# Patient Record
Sex: MALE | Race: ASIAN | NOT HISPANIC OR LATINO | URBAN - METROPOLITAN AREA
[De-identification: names, ages, dates, MRNs, and addresses within clinical notes are randomized per-mention and may not be internally consistent; named-entity substitution may affect disease eponyms.]

---

## 2017-07-04 ENCOUNTER — EMERGENCY (EMERGENCY)
Facility: HOSPITAL | Age: 32
LOS: 1 days | Discharge: HOME | End: 2017-07-04
Attending: EMERGENCY MEDICINE | Admitting: EMERGENCY MEDICINE
Payer: MEDICAID

## 2017-07-04 VITALS
OXYGEN SATURATION: 96 % | TEMPERATURE: 97 F | DIASTOLIC BLOOD PRESSURE: 91 MMHG | SYSTOLIC BLOOD PRESSURE: 137 MMHG | RESPIRATION RATE: 14 BRPM | HEART RATE: 65 BPM

## 2017-07-04 PROCEDURE — 93010 ELECTROCARDIOGRAM REPORT: CPT

## 2017-07-04 PROCEDURE — 99284 EMERGENCY DEPT VISIT MOD MDM: CPT | Mod: 25

## 2017-07-04 NOTE — ED PROVIDER NOTE - OBJECTIVE STATEMENT
32 yr male w/ hx of asthma p/w an episode of chest pain earlier today.    Patient reports that 2 hours prior to arrival 32 yr male w/ hx of asthma p/w an episode of chest pain earlier today.    Patient reports that 2 hours prior to arrival, he experienced sharp, left-sided chest pain, that began suddenly, lasted several seconds and resolved spontaneously. He also endorses some shortness of breath at the time but denies any palpitations, diaphoresis and nausea/vomiting. No personal of family history of cardiac disease. Pt does not smoke. Pt's symptoms have resolved completely at the time of exam.      that did not radiate anywhere, and was associated with mild shortness of breath. 32 yr male w/ hx of asthma p/w an episode of chest pain earlier today.    Patient reports that 20 min prior to arrival, he experienced sharp, left-sided intercostal pain, that began suddenly as he was jumping up from bed to get a glass of water, lasted several seconds and resolved spontaneously. He also endorses some shortness of breath at the time but denies any palpitations, diaphoresis and nausea/vomiting. No personal of family history of cardiac disease. Pt does not smoke. Pt's symptoms have resolved completely at the time of exam.  no recent travel or LE swelling, no recent surgeries or trauma, smokes a hookah regularly. Has a PMD but does not see him regularly.

## 2017-07-04 NOTE — ED PROVIDER NOTE - ATTENDING CONTRIBUTION TO CARE
Attending Attestation: Dr. Casey  I have personally performed a history and physical examination of the patient and discussed management with the resident as well as the patient.  I reviewed the resident's note and agree with the documented findings and plan of care.  I have authored and modified critical sections of the Provider Note, including but not limited to HPI, Physical Exam and MDM. Has minimal risk factors for heart disease. no known PE risk factors.  Will DC c PMD f/u for further eval.  Presentation c/w MSK pain/spasm, but understands he should come back fro any recurrence.

## 2017-07-04 NOTE — ED PROVIDER NOTE - MEDICAL DECISION MAKING DETAILS
pt's chest pain likely musculoskeletal in origin. EKG wnl, and pt has minimal risk factors for heart disease. Has minimal risk factors for heart disease. no known PE risk factors.  Will DC c PMD f/u for further eval.  Presentation c/w MSK pain/spasm, but understands he should come back fro any recurrence.

## 2019-01-26 ENCOUNTER — EMERGENCY (EMERGENCY)
Facility: HOSPITAL | Age: 34
LOS: 1 days | Discharge: HOME | End: 2019-01-26
Attending: EMERGENCY MEDICINE | Admitting: EMERGENCY MEDICINE
Payer: SELF-PAY

## 2019-01-26 VITALS
TEMPERATURE: 98 F | SYSTOLIC BLOOD PRESSURE: 159 MMHG | HEART RATE: 65 BPM | RESPIRATION RATE: 17 BRPM | DIASTOLIC BLOOD PRESSURE: 103 MMHG | OXYGEN SATURATION: 99 %

## 2019-01-26 VITALS
HEART RATE: 68 BPM | RESPIRATION RATE: 16 BRPM | SYSTOLIC BLOOD PRESSURE: 149 MMHG | OXYGEN SATURATION: 99 % | DIASTOLIC BLOOD PRESSURE: 105 MMHG

## 2019-01-26 PROBLEM — J45.909 UNSPECIFIED ASTHMA, UNCOMPLICATED: Chronic | Status: ACTIVE | Noted: 2017-07-04

## 2019-01-26 PROCEDURE — 93010 ELECTROCARDIOGRAM REPORT: CPT

## 2019-01-26 PROCEDURE — 71046 X-RAY EXAM CHEST 2 VIEWS: CPT | Mod: 26

## 2019-01-26 PROCEDURE — 99053 MED SERV 10PM-8AM 24 HR FAC: CPT

## 2019-01-26 PROCEDURE — 99283 EMERGENCY DEPT VISIT LOW MDM: CPT | Mod: 25

## 2019-01-26 NOTE — ED PROVIDER NOTE - NSFOLLOWUPINSTRUCTIONS_ED_ALL_ED_FT
Follow up with our ambulatory clinic as needed for repeat evaluation. Return here to the emergency department for repeat evaluation if you develop severe pain, intractable vomiting, or other new issues or concerns.

## 2019-01-26 NOTE — ED PROVIDER NOTE - ATTENDING CONTRIBUTION TO CARE
I performed the initial face to face bedside interview with this patient regarding history of present illness, review of symptoms and past medical, social and family history.  I completed an independent physical examination.  I was the initial provider who evaluated this patient.  The history, review of symptoms and examination was documented by me.  I have signed out the follow up of any pending tests (i.e. labs, radiological studies) to the resident..  I have discussed the patient’s plan of care and disposition with the resident.

## 2019-01-26 NOTE — ED PROVIDER NOTE - MEDICAL DECISION MAKING DETAILS
- brief episode of SOB and HR 57, but currently asymptomatic  - EKG NSR  - will check CXR to r/o spontaneous pneumothorax

## 2019-01-26 NOTE — ED PROVIDER NOTE - OBJECTIVE STATEMENT
33M +hookah smoker presents after episode of SOB this evening.  Pt states he was running up and down the stairs with his nephew, and felt winded.  Then when he laid down to sleep, felt short of breath and "gassy."  Noted that his HR went to 57, and was concerned that it was too slow.  Currently denies any complaints, feels back to baseline.  Denies fever/chills, cp, current sob, n/v/d.  Denies family h/o early cardiac disease.

## 2019-01-26 NOTE — ED ADULT TRIAGE NOTE - CHIEF COMPLAINT QUOTE
Pt walk in c/o Slowing Heart rate, when taking deep breath, straining. but now back to Normal. Also reports SOB at rest, Denies HA dizziness N V Sweating CP Denies PMHx Pt walk in c/o Slowing Heart rate, when taking deep breath, straining. but now  Heart rate  back to Normal. Also reports SOB at rest, Denies HA dizziness N V Sweating CP Denies PMHx

## 2023-05-11 ENCOUNTER — EMERGENCY (EMERGENCY)
Facility: HOSPITAL | Age: 38
LOS: 1 days | Discharge: HOME | End: 2023-05-11
Attending: EMERGENCY MEDICINE
Payer: SELF-PAY

## 2023-05-11 VITALS
WEIGHT: 210.1 LBS | TEMPERATURE: 98 F | SYSTOLIC BLOOD PRESSURE: 194 MMHG | HEIGHT: 72 IN | HEART RATE: 72 BPM | RESPIRATION RATE: 18 BRPM | OXYGEN SATURATION: 98 % | DIASTOLIC BLOOD PRESSURE: 120 MMHG

## 2023-05-11 PROBLEM — Z00.00 ENCOUNTER FOR PREVENTIVE HEALTH EXAMINATION: Status: ACTIVE | Noted: 2023-05-11

## 2023-05-11 LAB
ALBUMIN SERPL ELPH-MCNC: 4.6 G/DL — SIGNIFICANT CHANGE UP (ref 3.3–5.2)
ALP SERPL-CCNC: 140 U/L — HIGH (ref 40–120)
ALT FLD-CCNC: 20 U/L — SIGNIFICANT CHANGE UP
ANION GAP SERPL CALC-SCNC: 16 MMOL/L — SIGNIFICANT CHANGE UP (ref 5–17)
AST SERPL-CCNC: 16 U/L — SIGNIFICANT CHANGE UP
BASOPHILS # BLD AUTO: 0.1 K/UL — SIGNIFICANT CHANGE UP (ref 0–0.2)
BASOPHILS NFR BLD AUTO: 0.9 % — SIGNIFICANT CHANGE UP (ref 0–2)
BILIRUB SERPL-MCNC: 0.6 MG/DL — SIGNIFICANT CHANGE UP (ref 0.4–2)
BUN SERPL-MCNC: 12.7 MG/DL — SIGNIFICANT CHANGE UP (ref 8–20)
CALCIUM SERPL-MCNC: 9.6 MG/DL — SIGNIFICANT CHANGE UP (ref 8.4–10.5)
CHLORIDE SERPL-SCNC: 99 MMOL/L — SIGNIFICANT CHANGE UP (ref 96–108)
CK SERPL-CCNC: 72 U/L — SIGNIFICANT CHANGE UP (ref 30–200)
CO2 SERPL-SCNC: 21 MMOL/L — LOW (ref 22–29)
CREAT SERPL-MCNC: 0.82 MG/DL — SIGNIFICANT CHANGE UP (ref 0.5–1.3)
EGFR: 115 ML/MIN/1.73M2 — SIGNIFICANT CHANGE UP
EOSINOPHIL # BLD AUTO: 0.3 K/UL — SIGNIFICANT CHANGE UP (ref 0–0.5)
EOSINOPHIL NFR BLD AUTO: 2.6 % — SIGNIFICANT CHANGE UP (ref 0–6)
GLUCOSE SERPL-MCNC: 167 MG/DL — HIGH (ref 70–99)
HCT VFR BLD CALC: 48.9 % — SIGNIFICANT CHANGE UP (ref 39–50)
HGB BLD-MCNC: 17.3 G/DL — HIGH (ref 13–17)
IMM GRANULOCYTES NFR BLD AUTO: 0.3 % — SIGNIFICANT CHANGE UP (ref 0–0.9)
LYMPHOCYTES # BLD AUTO: 37.4 % — SIGNIFICANT CHANGE UP (ref 13–44)
LYMPHOCYTES # BLD AUTO: 4.29 K/UL — HIGH (ref 1–3.3)
MCHC RBC-ENTMCNC: 27.9 PG — SIGNIFICANT CHANGE UP (ref 27–34)
MCHC RBC-ENTMCNC: 35.4 GM/DL — SIGNIFICANT CHANGE UP (ref 32–36)
MCV RBC AUTO: 79 FL — LOW (ref 80–100)
MONOCYTES # BLD AUTO: 0.82 K/UL — SIGNIFICANT CHANGE UP (ref 0–0.9)
MONOCYTES NFR BLD AUTO: 7.2 % — SIGNIFICANT CHANGE UP (ref 2–14)
NEUTROPHILS # BLD AUTO: 5.91 K/UL — SIGNIFICANT CHANGE UP (ref 1.8–7.4)
NEUTROPHILS NFR BLD AUTO: 51.6 % — SIGNIFICANT CHANGE UP (ref 43–77)
PLATELET # BLD AUTO: 298 K/UL — SIGNIFICANT CHANGE UP (ref 150–400)
POTASSIUM SERPL-MCNC: 4 MMOL/L — SIGNIFICANT CHANGE UP (ref 3.5–5.3)
POTASSIUM SERPL-SCNC: 4 MMOL/L — SIGNIFICANT CHANGE UP (ref 3.5–5.3)
PROT SERPL-MCNC: 8.2 G/DL — SIGNIFICANT CHANGE UP (ref 6.6–8.7)
RBC # BLD: 6.19 M/UL — HIGH (ref 4.2–5.8)
RBC # FLD: 12.6 % — SIGNIFICANT CHANGE UP (ref 10.3–14.5)
SODIUM SERPL-SCNC: 136 MMOL/L — SIGNIFICANT CHANGE UP (ref 135–145)
TROPONIN T SERPL-MCNC: <0.01 NG/ML — SIGNIFICANT CHANGE UP (ref 0–0.06)
WBC # BLD: 11.46 K/UL — HIGH (ref 3.8–10.5)
WBC # FLD AUTO: 11.46 K/UL — HIGH (ref 3.8–10.5)

## 2023-05-11 PROCEDURE — 82550 ASSAY OF CK (CPK): CPT

## 2023-05-11 PROCEDURE — 71046 X-RAY EXAM CHEST 2 VIEWS: CPT | Mod: 26

## 2023-05-11 PROCEDURE — 99053 MED SERV 10PM-8AM 24 HR FAC: CPT

## 2023-05-11 PROCEDURE — 36415 COLL VENOUS BLD VENIPUNCTURE: CPT

## 2023-05-11 PROCEDURE — 99285 EMERGENCY DEPT VISIT HI MDM: CPT | Mod: 25

## 2023-05-11 PROCEDURE — 80053 COMPREHEN METABOLIC PANEL: CPT

## 2023-05-11 PROCEDURE — 93005 ELECTROCARDIOGRAM TRACING: CPT

## 2023-05-11 PROCEDURE — 84484 ASSAY OF TROPONIN QUANT: CPT

## 2023-05-11 PROCEDURE — 99284 EMERGENCY DEPT VISIT MOD MDM: CPT

## 2023-05-11 PROCEDURE — 71046 X-RAY EXAM CHEST 2 VIEWS: CPT

## 2023-05-11 PROCEDURE — 96374 THER/PROPH/DIAG INJ IV PUSH: CPT

## 2023-05-11 PROCEDURE — 93010 ELECTROCARDIOGRAM REPORT: CPT

## 2023-05-11 PROCEDURE — 85025 COMPLETE CBC W/AUTO DIFF WBC: CPT

## 2023-05-11 RX ORDER — SODIUM CHLORIDE 9 MG/ML
3 INJECTION INTRAMUSCULAR; INTRAVENOUS; SUBCUTANEOUS ONCE
Refills: 0 | Status: COMPLETED | OUTPATIENT
Start: 2023-05-11 | End: 2023-05-11

## 2023-05-11 RX ORDER — DIAZEPAM 5 MG
5 TABLET ORAL ONCE
Refills: 0 | Status: DISCONTINUED | OUTPATIENT
Start: 2023-05-11 | End: 2023-05-11

## 2023-05-11 RX ORDER — KETOROLAC TROMETHAMINE 30 MG/ML
30 SYRINGE (ML) INJECTION ONCE
Refills: 0 | Status: DISCONTINUED | OUTPATIENT
Start: 2023-05-11 | End: 2023-05-11

## 2023-05-11 RX ADMIN — Medication 5 MILLIGRAM(S): at 03:50

## 2023-05-11 RX ADMIN — SODIUM CHLORIDE 3 MILLILITER(S): 9 INJECTION INTRAMUSCULAR; INTRAVENOUS; SUBCUTANEOUS at 03:46

## 2023-05-11 RX ADMIN — Medication 30 MILLIGRAM(S): at 03:50

## 2023-05-11 NOTE — ED ADULT NURSE NOTE - NSFALLUNIVINTERV_ED_ALL_ED
Bed/Stretcher in lowest position, wheels locked, appropriate side rails in place/Call bell, personal items and telephone in reach/Instruct patient to call for assistance before getting out of bed/chair/stretcher/Non-slip footwear applied when patient is off stretcher/[place] to call system/Physically safe environment - no spills, clutter or unnecessary equipment/Purposeful proactive rounding/Room/bathroom lighting operational, light cord in reach

## 2023-05-11 NOTE — ED ADULT NURSE NOTE - OBJECTIVE STATEMENT
PT is A&Ox4, PT reports to ED with complaints of chest pain. PT states he had a panic attack earlier in the day. PT states pain was located in the Left upper back and Bicep, Denies NVD, Denies radiation or weakness.

## 2023-05-11 NOTE — ED PROVIDER NOTE - CARE PROVIDER_API CALL
Martha Solitario)  Cardiology; Cardiovascular Disease; Internal Medicine  [address] [place] [address]  Phone: [phone]  Fax: [phone]  Follow Up Time:

## 2023-05-11 NOTE — ED ADULT TRIAGE NOTE - CHIEF COMPLAINT QUOTE
Pt c/o L sided chest pain radiating to L arm sudden onset tonight. Pt stated he might be having an anxiety attack due to his mom who is in the hospital. Pt denies any past medical hx. Pt denies symptoms of n/v/sob/no neuro symptoms. EKG done.

## 2023-05-11 NOTE — ED PROVIDER NOTE - PATIENT PORTAL LINK FT
You can access the FollowMyHealth Patient Portal offered by [facility] by registering at the following website: [email or web address]. By joining [email or web address]’s FollowMyHealth portal, you will also be able to view your health information using other applications (apps) compatible with our system.

## 2023-05-11 NOTE — ED ADULT TRIAGE NOTE - ESI TRIAGE ACUITY LEVEL, MLM
Detail Level: Zone
Photo Preface (Leave Blank If You Do Not Want): Photographs were obtained today. 3cm irregular brown patch
3

## 2023-05-11 NOTE — ED PROVIDER NOTE - CARE PROVIDERS DIRECT ADDRESSES
[email or web address]

## 2023-05-11 NOTE — ED PROVIDER NOTE - OBJECTIVE STATEMENT
37 yo M with hx if seasonal asthma presents to Ed c/o what he believes was a panoic attack while driving home from work a few hours ago.  Pt 39 yo M with hx if seasonal asthma presents to ED c/o what he believes was a panic attack while driving home from work a few hours ago.  Pt states he felt his legs get sweaty and then developed pain in L upper back and numbness in L biceps area.  Pt admits to being anxious over the fact that his mom was admitted to [facility] and is going to have coronary stents placed.  Pt denies any assoc CP or SOB, N/V or syncope.

## 2024-07-21 NOTE — ED PROVIDER NOTE - NS ED MD DISPO DISCHARGE
[facility] EMERGENCY DEPARTMENT NOTE    Patient : Evin Snow Age: 31 year old Sex: male   MRN: [id number] Encounter Date: 7/21/2024    History of Present Illness      Chief Complaint   Patient presents with    Alcohol Intoxication    Vomiting       Evin Snow is a 31 year old male presenting to the ED with his father after having some n/v after a wedding tonight. Patient reports drinking \"too many\" alcoholic beverages and his father states that he has had poor tolerance to alcohol historically. No falls / injuries seen while at wedding or after . No PMH.     Past Medical History     No Known Allergies    No past medical history on file.    No past surgical history on file.    No family history on file.         Physical Exam     ED Triage Vitals [07/21/24 0210]   ED Triage Vitals Group      Temp 97 °F (36.1 °C)      Heart Rate 92      Resp 20      /88      SpO2 100 %      EtCO2 mmHg       Height 5' 7\" (1.702 m)      Weight 150 lb (68 kg)      Weight Scale Used ED Stated      BMI (Calculated) 23.49      IBW/kg (Calculated) 66.1       Physical Exam    Gen: NAD, AOx3  Neuro: GCS 15, CN’s II-XII grossly intact, steady gait  HEENT: NCAT, MMM,   Neck: No JVD, full ROM,   PULM: RR, normal WOB,   Cards: RRR, 2+ pulses,   Abd: ND, NTTP,   Back: Normal alignment, no CVAT,   Ext: grossly intact x 4, movement 5/5 strength,   Psych: mood, affect normal for situation,   Skin: wwp, no rash,       Lab Results     Results for orders placed or performed during the hospital encounter of 07/21/24   Lipase   Result Value Ref Range    Lipase 36 15 - 77 Units/L   Magnesium   Result Value Ref Range    Magnesium 2.1 1.7 - 2.4 mg/dL   Alcohol   Result Value Ref Range    Alcohol 63 (H) <3 mg/dL   Comprehensive Metabolic Panel   Result Value Ref Range    Fasting Status      Sodium 141 135 - 145 mmol/L    Potassium 4.6 3.4 - 5.1 mmol/L    Chloride 104 97 - 110 mmol/L    Carbon Dioxide 25 21 - 32 mmol/L    Anion Gap 17 7 - 19 mmol/L     Glucose 96 70 - 99 mg/dL    BUN 13 6 - 20 mg/dL    Creatinine 0.88 0.67 - 1.17 mg/dL    Glomerular Filtration Rate >90 >=60    BUN/Cr 15 7 - 25    Calcium 9.4 8.4 - 10.2 mg/dL    Bilirubin, Total 0.6 0.2 - 1.0 mg/dL    GOT/AST 15 <=37 Units/L    GPT/ALT 25 <64 Units/L    Alkaline Phosphatase 44 (L) 45 - 117 Units/L    Albumin 4.4 3.6 - 5.1 g/dL    Protein, Total 7.0 6.4 - 8.2 g/dL    Globulin 2.6 2.0 - 4.0 g/dL    A/G Ratio 1.7 1.0 - 2.4   CBC with Automated Differential (performable only)   Result Value Ref Range    WBC 14.1 (H) 4.2 - 11.0 K/mcL    RBC 5.43 4.50 - 5.90 mil/mcL    HGB 15.8 13.0 - 17.0 g/dL    HCT 47.9 39.0 - 51.0 %    MCV 88.2 78.0 - 100.0 fl    MCH 29.1 26.0 - 34.0 pg    MCHC 33.0 32.0 - 36.5 g/dL    RDW-CV 13.5 11.0 - 15.0 %    RDW-SD 43.5 39.0 - 50.0 fL     140 - 450 K/mcL    NRBC 0 <=0 /100 WBC    Neutrophil, Percent 84 %    Lymphocytes, Percent 10 %    Mono, Percent 6 %    Eosinophils, Percent 0 %    Basophils, Percent 0 %    Immature Granulocytes 0 %    Absolute Neutrophils 11.7 (H) 1.8 - 7.7 K/mcL    Absolute Lymphocytes 1.4 1.0 - 4.8 K/mcL    Absolute Monocytes 0.8 0.3 - 0.9 K/mcL    Absolute Eosinophils  0.1 0.0 - 0.5 K/mcL    Absolute Basophils 0.1 0.0 - 0.3 K/mcL    Absolute Immature Granulocytes 0.1 0.0 - 0.2 K/mcL       EKG Results     No results found for this or any previous visit (from the past 4464 hour(s)).    EKG Interpretation: no STEMI  Cardiac monitor: nsr per my interpretation.     Radiology Results     Imaging Results    None         ED Course     Procedures    ED Medication Orders (From admission, onward)      Ordered Start     Status Ordering Provider    07/21/24 0227 07/21/24 0230  sodium chloride (NORMAL SALINE) 0.9 % bolus 1,000 mL  ONCE         Last MAR action: ESAU Jolly    07/21/24 0227 07/21/24 0230  ondansetron (ZOFRAN) injection 4 mg  ONCE         Last MAR action: Given ESAU SANDERS            Vitals:    07/21/24 0210 07/21/24 0410   BP:  129/88 132/89   Pulse: 92 90   Resp: 20 20   Temp: 97 °F (36.1 °C)    TempSrc: Temporal    SpO2: 100% 98%   Weight: 68 kg (150 lb)    Height: 5' 7\" (1.702 m)        Is the patient potentially septic? No, explain: no infection      Medical Decision Making        Evin Snow is a 31 year old male who presented with   Chief Complaint   Patient presents with    Alcohol Intoxication    Vomiting    Patient is hemodynamically stable, afebrile. Patient seen and evaluated. ETOH level somewhat low today but apparently there is a history of robust responses to ETOH. Able to achieve PO tolerance in the ED and he ambulated well.          Results of my diagnostic evaluation were explained to the patient and/or family members. Patient and/or family were given the opportunity to ask questions and are in agreement with plan of care.        Clinical Impression     ED Diagnosis   1. Alcoholic intoxication without complication (CMD)            Disposition        Discharge 7/21/2024  5:08 AM  Evin Snow discharge to home/self care.        Medications   sodium chloride (NORMAL SALINE) 0.9 % bolus 1,000 mL (1,000 mLs Intravenous New Bag 7/21/24 0328)   ondansetron (ZOFRAN) injection 4 mg (4 mg Intravenous Given 7/21/24 0327)       New Prescriptions    No medications on file         Daniel Rodriguez MD   7/21/2024 2:48 AM     This note was made using voice dictation and may include inadvertent errors due to the dictation software. Please contact for clarification regarding any noted discrepancies.           Daniel Rodriguez MD  07/21/24 3024     Home